# Patient Record
(demographics unavailable — no encounter records)

---

## 2025-05-06 NOTE — HISTORY OF PRESENT ILLNESS
[Mother] : mother [2%] : 2%  milk  [Sugar drinks] : sugar drinks [Fruit] : fruit [Meat] : meat [Grains] : grains [Dairy] : dairy [Eats meals with family] : eats meals with family [Firm] : stools are firm consistency [Normal] : Normal [In own bed] : In own bed [Brushing teeth twice/d] : brushing teeth twice per day [Yes] : Patient goes to dentist yearly [Toothpaste] : Primary Fluoride Source: Toothpaste [Playtime (60 min/d)] : playtime 60 min a day [Appropiate parent-child-sibling interaction] : appropriate parent-child-sibling interaction [Has Friends] : has friends [Grade ___] : Grade [unfilled] [Adequate social interactions] : adequate social interactions [Adequate behavior] : adequate behavior [Adequate performance] : adequate performance [Adequate attention] : adequate attention [No] : No cigarette smoke exposure [Appropriately restrained in motor vehicle] : appropriately restrained in motor vehicle [Supervised outdoor play] : supervised outdoor play [Supervised around water] : supervised around water [Wears helmet and pads] : wears helmet and pads [Parent knows child's friends] : parent knows child's friends [Parent discusses safety rules regarding adults] : parent discusses safety rules regarding adults [Up to date] : Up to date [NO] : No [Exposure to electronic nicotine delivery system] : No exposure to electronic nicotine delivery system [FreeTextEntry7] : Mark is 8yr old now, doing okay better in some aspect, still has struggles with anger issues, mostly towards mom, does fight w/ sister and minimal in school and dad. family gets group therapy and Mark does individual therapy also.  [de-identified] : likes snacks in general and larger portion if they don't stop him  [FreeTextEntry9] : does fight with sister/spats  [de-identified] : but school performance has been fine.  [FreeTextEntry1] : Mark is 8yr old now, in 2nd grade, school performance is good according to mom, no issues but overall behaviorally still has anger outbursts and not towards father but mostly to mom and feels since dad is bigger/tougher and he doesn't do it like he'd towards mom, also spats a lot with Estelle. not new. undergoing family therapy and also gets individual therapy. but so far been behaving better in school

## 2025-05-06 NOTE — DISCUSSION/SUMMARY
[Normal Growth] : growth [Normal Development] : development [None] : No known medical problems [No Elimination Concerns] : elimination [No Feeding Concerns] : feeding [No Skin Concerns] : skin [Normal Sleep Pattern] : sleep [School] : school [Development and Mental Health] : development and mental health [Nutrition and Physical Activity] : nutrition and physical activity [Oral Health] : oral health [Safety] : safety [No Medications] : ~He/She~ is not on any medications [Patient] : patient [Full Activity without restrictions including Physical Education & Athletics] : Full Activity without restrictions including Physical Education & Athletics [I have examined the above-named student and completed the preparticipation physical evaluation. The athlete does not present apparent clinical contraindications to practice and participate in sport(s) as outlined above. A copy of the physical exam is on r] : I have examined the above-named student and completed the preparticipation physical evaluation. The athlete does not present apparent clinical contraindications to practice and participate in sport(s) as outlined above. A copy of the physical exam is on record in my office and can be made available to the school at the request of the parents. If conditions arise after the athlete has been cleared for participation, the physician may rescind the clearance until the problem is resolved and the potential consequences are completely explained to the athlete (and parents/guardians). [FreeTextEntry1] : well 8yr old boy d/w mom about general habits/concerns, continue being active and offer healthy choices  continue school support and therapies

## 2025-05-06 NOTE — HISTORY OF PRESENT ILLNESS
[Mother] : mother [2%] : 2%  milk  [Sugar drinks] : sugar drinks [Fruit] : fruit [Meat] : meat [Grains] : grains [Dairy] : dairy [Eats meals with family] : eats meals with family [Firm] : stools are firm consistency [Normal] : Normal [In own bed] : In own bed [Brushing teeth twice/d] : brushing teeth twice per day [Yes] : Patient goes to dentist yearly [Toothpaste] : Primary Fluoride Source: Toothpaste [Playtime (60 min/d)] : playtime 60 min a day [Appropiate parent-child-sibling interaction] : appropriate parent-child-sibling interaction [Has Friends] : has friends [Grade ___] : Grade [unfilled] [Adequate social interactions] : adequate social interactions [Adequate behavior] : adequate behavior [Adequate performance] : adequate performance [Adequate attention] : adequate attention [No] : No cigarette smoke exposure [Appropriately restrained in motor vehicle] : appropriately restrained in motor vehicle [Supervised outdoor play] : supervised outdoor play [Supervised around water] : supervised around water [Wears helmet and pads] : wears helmet and pads [Parent knows child's friends] : parent knows child's friends [Parent discusses safety rules regarding adults] : parent discusses safety rules regarding adults [Up to date] : Up to date [NO] : No [Exposure to electronic nicotine delivery system] : No exposure to electronic nicotine delivery system [FreeTextEntry7] : Mark is 8yr old now, doing okay better in some aspect, still has struggles with anger issues, mostly towards mom, does fight w/ sister and minimal in school and dad. family gets group therapy and Mark does individual therapy also.  [de-identified] : likes snacks in general and larger portion if they don't stop him  [FreeTextEntry9] : does fight with sister/spats  [de-identified] : but school performance has been fine.  [FreeTextEntry1] : Mark is 8yr old now, in 2nd grade, school performance is good according to mom, no issues but overall behaviorally still has anger outbursts and not towards father but mostly to mom and feels since dad is bigger/tougher and he doesn't do it like he'd towards mom, also spats a lot with Estelle. not new. undergoing family therapy and also gets individual therapy. but so far been behaving better in school